# Patient Record
Sex: MALE | NOT HISPANIC OR LATINO | Employment: FULL TIME | URBAN - METROPOLITAN AREA
[De-identification: names, ages, dates, MRNs, and addresses within clinical notes are randomized per-mention and may not be internally consistent; named-entity substitution may affect disease eponyms.]

---

## 2022-10-14 ENCOUNTER — OFFICE VISIT (OUTPATIENT)
Dept: URGENT CARE | Facility: URGENT CARE | Age: 32
End: 2022-10-14
Payer: COMMERCIAL

## 2022-10-14 ENCOUNTER — ANCILLARY PROCEDURE (OUTPATIENT)
Dept: GENERAL RADIOLOGY | Facility: CLINIC | Age: 32
End: 2022-10-14
Attending: PHYSICIAN ASSISTANT
Payer: COMMERCIAL

## 2022-10-14 VITALS
WEIGHT: 146 LBS | DIASTOLIC BLOOD PRESSURE: 84 MMHG | OXYGEN SATURATION: 99 % | HEART RATE: 69 BPM | RESPIRATION RATE: 16 BRPM | TEMPERATURE: 97.8 F | SYSTOLIC BLOOD PRESSURE: 120 MMHG

## 2022-10-14 DIAGNOSIS — S62.621A CLOSED DISPLACED FRACTURE OF MIDDLE PHALANX OF LEFT INDEX FINGER, INITIAL ENCOUNTER: Primary | ICD-10-CM

## 2022-10-14 DIAGNOSIS — S69.92XA INJURY OF FINGER OF LEFT HAND, INITIAL ENCOUNTER: ICD-10-CM

## 2022-10-14 PROCEDURE — 73140 X-RAY EXAM OF FINGER(S): CPT | Mod: TC | Performed by: RADIOLOGY

## 2022-10-14 PROCEDURE — 99203 OFFICE O/P NEW LOW 30 MIN: CPT | Performed by: PHYSICIAN ASSISTANT

## 2022-10-14 NOTE — PROGRESS NOTES
SUBJECTIVE:  Cory Morse is a 32 year old male who comes in with injury to his left index finger sustained approximately 1 week ago.  Patient states that he was playing cricket when the ball struck his finger and bent it.  He has swelling in the joint and is painful to move.  He has been icing it and using a water and turmeric mixture for symptom relief.  Has not taken any Tylenol or ibuprofen.  Denies any numbness or tingling in the finger.  Patient is right-handed.  He is here for possible fracture and would like an x-ray.      No past medical history on file.  There is no problem list on file for this patient.    No current outpatient medications on file.     No current facility-administered medications for this visit.     Social History     Socioeconomic History     Marital status: Single     Spouse name: Not on file     Number of children: Not on file     Years of education: Not on file     Highest education level: Not on file   Occupational History     Not on file   Tobacco Use     Smoking status: Never     Smokeless tobacco: Never   Substance and Sexual Activity     Alcohol use: Not on file     Drug use: Not on file     Sexual activity: Not on file   Other Topics Concern     Not on file   Social History Narrative     Not on file     Social Determinants of Health     Financial Resource Strain: Not on file   Food Insecurity: Not on file   Transportation Needs: Not on file   Physical Activity: Not on file   Stress: Not on file   Social Connections: Not on file   Intimate Partner Violence: Not on file   Housing Stability: Not on file     ROS  Negative other than stated above    Exam:  GENERAL APPEARANCE: healthy, alert and no distress  EYES: EOMI,  PERRL  MS: Left index finger with mild swelling and tenderness along PIP joint.  There is no deformity.  Is able to flex and extend.  There is no obvious deformity noted.  Tendon function fully intact.  Remainder fingers and hands nontender  SKIN: no suspicious  lesions or rashes  NEURO: Normal strength and tone, sensory exam grossly normal, mentation intact and speech normal    Results for orders placed or performed in visit on 10/14/22   XR Finger Left G/E 2 Views     Status: None    Narrative    EXAM: XR FINGER LEFT G/E 2 VIEWS  LOCATION: [facility]  DATE/TIME: 10/14/2022 6:23 PM    INDICATION: Left finger injury and pain.  COMPARISON: None.      Impression    IMPRESSION:  1.  Small minimally displaced acute intra-articular fracture of the left 2nd finger middle phalanx palmar base.  2.  Mild dorsal subluxation of the 2nd PIP joint.  3.  Soft tissue swelling at the 2nd PIP joint.       assessment/plan:  (S65.107G) Closed displaced fracture of middle phalanx of left index finger, initial encounter  (primary encounter diagnosis)  Comment:   Plan: Patient with fracture of the left index finger PIP joint.  Advised to continue with ice and add some over-the-counter ibuprofen for swelling and pain.  Finger splint was given.  He is to wear this for the next 2 to 3 weeks to allow healing.  May return to activity as tolerated.  Red flag signs were discussed we will follow-up with primary as needed    (S68.88XA) Injury of finger of left hand, initial encounter  Comment:   Plan: XR Finger Left G/E 2 Views